# Patient Record
Sex: FEMALE | Race: WHITE | URBAN - METROPOLITAN AREA
[De-identification: names, ages, dates, MRNs, and addresses within clinical notes are randomized per-mention and may not be internally consistent; named-entity substitution may affect disease eponyms.]

---

## 2020-10-13 ENCOUNTER — HOSPITAL ENCOUNTER (OUTPATIENT)
Dept: MAMMOGRAPHY | Age: 55
Discharge: HOME | End: 2020-10-15
Payer: COMMERCIAL

## 2020-10-13 PROCEDURE — 77063 BREAST TOMOSYNTHESIS BI: CPT

## 2021-03-19 ENCOUNTER — OFFICE VISIT (OUTPATIENT)
Dept: ONCOLOGY | Age: 56
End: 2021-03-19
Payer: COMMERCIAL

## 2021-03-19 ENCOUNTER — HOSPITAL ENCOUNTER (OUTPATIENT)
Dept: INFUSION THERAPY | Age: 56
Discharge: HOME | End: 2021-03-19
Payer: COMMERCIAL

## 2021-03-19 VITALS
WEIGHT: 170.7 LBS | SYSTOLIC BLOOD PRESSURE: 135 MMHG | OXYGEN SATURATION: 98 % | DIASTOLIC BLOOD PRESSURE: 74 MMHG | HEART RATE: 62 BPM | BODY MASS INDEX: 32.23 KG/M2 | HEIGHT: 61 IN | TEMPERATURE: 97.2 F | RESPIRATION RATE: 18 BRPM

## 2021-03-19 DIAGNOSIS — D72.829 LEUKOCYTOSIS, UNSPECIFIED TYPE: ICD-10-CM

## 2021-03-19 DIAGNOSIS — D72.829 LEUKOCYTOSIS, UNSPECIFIED TYPE: Primary | ICD-10-CM

## 2021-03-19 LAB
ALBUMIN SERPL-MCNC: 4.2 G/DL (ref 3.5–5.2)
ALP BLD-CCNC: 86 U/L (ref 35–104)
ALT SERPL-CCNC: 48 U/L (ref 0–32)
ANION GAP SERPL CALCULATED.3IONS-SCNC: 9 MMOL/L (ref 7–16)
AST SERPL-CCNC: 45 U/L (ref 0–31)
BASOPHILS ABSOLUTE: 0.03 E9/L (ref 0–0.2)
BASOPHILS RELATIVE PERCENT: 0.5 % (ref 0–2)
BILIRUB SERPL-MCNC: 0.3 MG/DL (ref 0–1.2)
BUN BLDV-MCNC: 15 MG/DL (ref 6–20)
CALCIUM SERPL-MCNC: 9.2 MG/DL (ref 8.6–10.2)
CHLORIDE BLD-SCNC: 106 MMOL/L (ref 98–107)
CO2: 26 MMOL/L (ref 22–29)
CREAT SERPL-MCNC: 0.6 MG/DL (ref 0.5–1)
EOSINOPHILS ABSOLUTE: 0.09 E9/L (ref 0.05–0.5)
EOSINOPHILS RELATIVE PERCENT: 1.6 % (ref 0–6)
GFR AFRICAN AMERICAN: >60
GFR NON-AFRICAN AMERICAN: >60 ML/MIN/1.73
GLUCOSE BLD-MCNC: 94 MG/DL (ref 74–99)
HCT VFR BLD CALC: 43.7 % (ref 34–48)
HEMOGLOBIN: 13.3 G/DL (ref 11.5–15.5)
IMMATURE GRANULOCYTES #: 0.1 E9/L
IMMATURE GRANULOCYTES %: 1.8 % (ref 0–5)
LACTATE DEHYDROGENASE: 134 U/L (ref 135–214)
LYMPHOCYTES ABSOLUTE: 1.74 E9/L (ref 1.5–4)
LYMPHOCYTES RELATIVE PERCENT: 31.7 % (ref 20–42)
MCH RBC QN AUTO: 27.9 PG (ref 26–35)
MCHC RBC AUTO-ENTMCNC: 30.4 % (ref 32–34.5)
MCV RBC AUTO: 91.8 FL (ref 80–99.9)
MONOCYTES ABSOLUTE: 0.41 E9/L (ref 0.1–0.95)
MONOCYTES RELATIVE PERCENT: 7.5 % (ref 2–12)
NEUTROPHILS ABSOLUTE: 3.12 E9/L (ref 1.8–7.3)
NEUTROPHILS RELATIVE PERCENT: 56.9 % (ref 43–80)
PATHOLOGIST REVIEW: NORMAL
PDW BLD-RTO: 14.5 FL (ref 11.5–15)
PLATELET # BLD: 307 E9/L (ref 130–450)
PMV BLD AUTO: 10.2 FL (ref 7–12)
POTASSIUM SERPL-SCNC: 4 MMOL/L (ref 3.5–5)
RBC # BLD: 4.76 E12/L (ref 3.5–5.5)
SODIUM BLD-SCNC: 141 MMOL/L (ref 132–146)
TOTAL PROTEIN: 6.1 G/DL (ref 6.4–8.3)
WBC # BLD: 5.5 E9/L (ref 4.5–11.5)

## 2021-03-19 PROCEDURE — 83615 LACTATE (LD) (LDH) ENZYME: CPT

## 2021-03-19 PROCEDURE — 80053 COMPREHEN METABOLIC PANEL: CPT

## 2021-03-19 PROCEDURE — 36415 COLL VENOUS BLD VENIPUNCTURE: CPT

## 2021-03-19 PROCEDURE — 85025 COMPLETE CBC W/AUTO DIFF WBC: CPT

## 2021-03-19 PROCEDURE — 99214 OFFICE O/P EST MOD 30 MIN: CPT

## 2021-03-19 RX ORDER — PANTOPRAZOLE SODIUM 40 MG/1
TABLET, DELAYED RELEASE ORAL
COMMUNITY
Start: 2021-02-26

## 2021-03-19 RX ORDER — ESTRADIOL 1 MG/1
TABLET ORAL
COMMUNITY
Start: 2021-03-18

## 2021-03-19 RX ORDER — LEVOTHYROXINE SODIUM 0.03 MG/1
TABLET ORAL
COMMUNITY

## 2021-03-19 RX ORDER — CHOLECALCIFEROL (VITAMIN D3) 1250 MCG
CAPSULE ORAL
COMMUNITY
Start: 2021-03-05

## 2021-03-19 NOTE — PROGRESS NOTES
Elly Ndiaye  1965 50 y.o. Referring Physician:    PCP: Lucila Sanchez, DO    Vitals:    21 0916   BP: 135/74   Pulse: 62   Resp: 18   Temp: 97.2 °F (36.2 °C)   SpO2: 98%        Wt Readings from Last 3 Encounters:   21 170 lb 11.2 oz (77.4 kg)   10/17/17 156 lb (70.8 kg)   16 156 lb (70.8 kg)        Body mass index is 32.25 kg/m². Chief Complaint:   Chief Complaint   Patient presents with    New Patient         Cancer Staging  No matching staging information was found for the patient. Prior Radiation Therapy? NO    Concurrent Chemo/radiation? NO    Prior Chemotherapy? NO    Prior Hormonal Therapy? NO    Head and Neck Cancer? No, patient does NOT have HN cancer. LMP:     Age at first Menses: 7    : 3    Para: 2          Current Outpatient Medications:     Cholecalciferol (VITAMIN D3) 1.25 MG (45280 UT) CAPS, TAKE 1 CAPSULE BY MOUTH ONCE A WEEK FOR 8 WEEKS, Disp: , Rfl:     levothyroxine (SYNTHROID) 25 MCG tablet, , Disp: , Rfl:     pantoprazole (PROTONIX) 40 MG tablet, TAKE 1 TABLET BY MOUTH ONCE DAILY, Disp: , Rfl:     estradiol (ESTRACE) 1 MG tablet, , Disp: , Rfl:     therapeutic multivitamin-minerals (THERAGRAN-M) tablet, Take 1 tablet by mouth daily.  doesn't take daily, Disp: , Rfl:     clindamycin (CLEOCIN) 300 MG capsule, Take 300 mg by mouth 3 times daily, Disp: , Rfl:        Past Medical History:   Diagnosis Date    Cancer ([facility] [address] 1985    precancer on cervix    Nausea & vomiting     Ovarian cyst     PCOS (polycystic ovarian syndrome)     prior to gastric bypass    Uterine fibroid        Past Surgical History:   Procedure Laterality Date    ABDOMEN SURGERY      laparoscopic     CHOLECYSTECTOMY      FOOT SURGERY  2006    right   bone shaving    GASTRIC BYPASS SURGERY  2000    HYSTERECTOMY      OTHER SURGICAL HISTORY  2012    diagnositic lap right oophorectomy    HOMERO AND BSO N/A 13    Laparoscopic, robotic       History reviewed. No pertinent family history. Social History     Socioeconomic History    Marital status:      Spouse name: Not on file    Number of children: Not on file    Years of education: Not on file    Highest education level: Not on file   Occupational History    Not on file   Social Needs    Financial resource strain: Not on file    Food insecurity     Worry: Not on file     Inability: Not on file    Transportation needs     Medical: Not on file     Non-medical: Not on file   Tobacco Use    Smoking status: Never Smoker    Smokeless tobacco: Never Used   Substance and Sexual Activity    Alcohol use: No    Drug use: No    Sexual activity: Not on file   Lifestyle    Physical activity     Days per week: Not on file     Minutes per session: Not on file    Stress: Not on file   Relationships    Social connections     Talks on phone: Not on file     Gets together: Not on file     Attends Mandaen service: Not on file     Active member of club or organization: Not on file     Attends meetings of clubs or organizations: Not on file     Relationship status: Not on file    Intimate partner violence     Fear of current or ex partner: Not on file     Emotionally abused: Not on file     Physically abused: Not on file     Forced sexual activity: Not on file   Other Topics Concern    Not on file   Social History Narrative    Not on file           Occupation: /  Retired:  NO          REVIEW OF SYSTEMS:    Pacemaker/Defibulator/ICD:  No    Mediport: No           FALLS RISK SCREENING ASSESSMENT    Instructions:  Assess the patient and [place] the appropriate indicators that are present for fall risk identification. Total the numbers circled and assign a fall risk score from Table 2.  Reassess patient at a minimum every 12 weeks or with status change. Assessment   Date  3/19/2021     1. Mental Ability: confusion/cognitively impaired No - 0       2.   Elimination Issues:

## 2021-03-19 NOTE — PROGRESS NOTES
[address]  [facility] [address]   Hematology/Oncology  Consult      Patient Name: Billy Del Toro  YOB: 1965  PCP: Elvie Corea DO   Referring Provider:      Reason for Consultation: No chief complaint on file. History of Present Illness: This pt is a very pleasant 51 yo female who presents today in referral from her PCP for evaluation of her WBC. On review of her CBC from 2/26/21, she actually has a normal WBC, H+H and platelets. Differential reported a high myelocyte % at 6%, and absolute count of 456. She had a fall in ~mid 2/21, per patient on a wet floor and had sift tissue trauma to her L knee. She received a steroid injection and was placed on a medrol dose william which finished ~3 days prior to her CBC. She has not had recurrent fevers, chills night sweats, no unintentional weight loss. Appetite is stable and normal. No new masses or LN's. No abnormal bleeding or ecchymosis. She states the males in her family have a strong history of visceral cancer, and she may have had a grandfather with some type of leukemia (she can not recall).  Other than her knee, she feels well today    Diagnostic Data:     Past Medical History:   Diagnosis Date    Cancer ([address] 1985    precancer on cervix    Nausea & vomiting     Ovarian cyst     PCOS (polycystic ovarian syndrome)     prior to gastric bypass    Uterine fibroid        Patient Active Problem List    Diagnosis Date Noted    Excessive or frequent menstruation 06/14/2013    Leiomyoma of uterus 06/14/2013    Other and unspecified ovarian cyst 06/14/2013    Female genital symptoms 12/05/2012    Other and unspecified ovarian cyst 12/05/2012        Past Surgical History:   Procedure Laterality Date    ABDOMEN SURGERY      laparoscopic     CHOLECYSTECTOMY  1994    FOOT SURGERY  2006    right   bone shaving    GASTRIC BYPASS SURGERY  2000    HYSTERECTOMY      OTHER SURGICAL HISTORY  12/5/2012    diagnositic lap right oophorectomy    HOMERO AND BSO N/A 6-14-13    Laparoscopic, robotic       Family History  No family history on file. Social History    TOBACCO:   reports that she has never smoked. She has never used smokeless tobacco.  ETOH:   reports no history of alcohol use. Home Medications  Prior to Admission medications    Medication Sig Start Date End Date Taking? Authorizing Provider   estradiol (ESTRACE) 0.5 MG tablet Take 1 mg by mouth daily    Yes Historical Provider, MD   clindamycin (CLEOCIN) 300 MG capsule Take 300 mg by mouth 3 times daily    Historical Provider, MD   therapeutic multivitamin-minerals (THERAGRAN-M) tablet Take 1 tablet by mouth daily. doesn't take daily    Historical Provider, MD       Allergies  Allergies   Allergen Reactions    Morphine Hives and Itching     severe    Demerol Hcl [Meperidine] Nausea And Vomiting     Can tolerate if mixed with vistaril        Review of Systems:    + L knee pain and difficulty with ambulation      Objective  /74   Pulse 62   Temp 97.2 °F (36.2 °C)   Resp 18   Ht 5' 1\" (1.549 m)   Wt 170 lb 11.2 oz (77.4 kg)   LMP 04/01/2013   SpO2 98%   BMI 32.25 kg/m²     Physical Performance Status    Physical Exam:  General: AAO to person, place, time, and purpose, appears stated age, cooperative, no acute distress, pleasant   Head and neck : PERRLA, EOMI . Sclera non icteric. Oropharynx : Clear  Neck: no JVD,  no adenopathy  LYMPHATICS : No LAD  Lungs: Clear to auscultation   Heart: Regular rate and regular rhythm, no murmur, normal S1, S2  Abdomen: Soft, non-tender;no masses, no organomegaly  Extremities: No edema, L knee is supportive wrap/brace  Skin:  No rash  Neurologic:Cranial nerves grossly intact.  No focal motor or sensory deficits    Recent Laboratory Data-   Lab Results   Component Value Date    WBC 5.7 06/12/2013    HGB 11.9 06/12/2013    HCT 37.0 06/12/2013    MCV 77.3 (L) 06/12/2013     06/12/2013    LYMPHOPCT 5 (L) 05/02/2011 RBC 4.78 06/12/2013    MCH 25.0 (L) 06/12/2013    MCHC 32.3 06/12/2013    RDW 15.9 (H) 06/12/2013    MONOPCT 5 05/02/2011    BASOPCT 0 05/02/2011    NEUTROABS 9.06 (H) 05/02/2011    LYMPHSABS 0.55 (L) 05/02/2011    MONOSABS 0.55 05/02/2011    EOSABS 0.00 (L) 05/02/2011    BASOSABS 0.01 05/02/2011       Lab Results   Component Value Date     04/28/2011    K 3.7 04/28/2011     04/28/2011    CO2 25 04/28/2011    BUN 17 04/28/2011    CREATININE 0.8 04/28/2011    GLUCOSE 86 04/28/2011    CALCIUM 10.0 04/28/2011    LABGLOM >60 04/28/2011       No results found for: IRON, TIBC, FERRITIN        Radiology-    No results found. ASSESSMENT/PLAN :    Diagnoses and all orders for this visit:    Leukocytosis, unspecified type    49 yo female  Elevated myelocyte count    - CBC reviewed as in HPI. Completely normal except increased myelocytes  - She has no B symptoms/alarm symptoms  - Physical exam is negative  - Repeat CBC. Check LDH and get manual smear  - Possible the myelocyte count is related to prior steroid exposure or lab abnormality  - RTC in 3 weeks for results review and CBC recheck. Further evaluation pending above results    Thank you for this consult.  Please call with further questions or concerns      Maria Eugenia Gillette MD   Electronically signed 3/19/2021 at 9:32 AM

## 2021-04-08 ENCOUNTER — HOSPITAL ENCOUNTER (OUTPATIENT)
Dept: INFUSION THERAPY | Age: 56
Discharge: HOME | End: 2021-04-08
Payer: COMMERCIAL

## 2021-04-08 ENCOUNTER — APPOINTMENT (OUTPATIENT)
Dept: INFUSION THERAPY | Age: 56
End: 2021-04-08
Payer: COMMERCIAL

## 2021-04-08 DIAGNOSIS — D72.829 LEUKOCYTOSIS, UNSPECIFIED TYPE: ICD-10-CM

## 2021-04-08 LAB
ALBUMIN SERPL-MCNC: 3.9 G/DL (ref 3.5–5.2)
ALP BLD-CCNC: 77 U/L (ref 35–104)
ALT SERPL-CCNC: 43 U/L (ref 0–32)
ANION GAP SERPL CALCULATED.3IONS-SCNC: 9 MMOL/L (ref 7–16)
AST SERPL-CCNC: 39 U/L (ref 0–31)
BASOPHILS ABSOLUTE: 0.04 E9/L (ref 0–0.2)
BASOPHILS RELATIVE PERCENT: 0.5 % (ref 0–2)
BILIRUB SERPL-MCNC: 0.5 MG/DL (ref 0–1.2)
BUN BLDV-MCNC: 19 MG/DL (ref 6–20)
CALCIUM SERPL-MCNC: 9.1 MG/DL (ref 8.6–10.2)
CHLORIDE BLD-SCNC: 101 MMOL/L (ref 98–107)
CO2: 26 MMOL/L (ref 22–29)
CREAT SERPL-MCNC: 0.6 MG/DL (ref 0.5–1)
EOSINOPHILS ABSOLUTE: 0.07 E9/L (ref 0.05–0.5)
EOSINOPHILS RELATIVE PERCENT: 0.8 % (ref 0–6)
GFR AFRICAN AMERICAN: >60
GFR NON-AFRICAN AMERICAN: >60 ML/MIN/1.73
GLUCOSE BLD-MCNC: 107 MG/DL (ref 74–99)
HCT VFR BLD CALC: 41.5 % (ref 34–48)
HEMOGLOBIN: 12.8 G/DL (ref 11.5–15.5)
IMMATURE GRANULOCYTES #: 0.11 E9/L
IMMATURE GRANULOCYTES %: 1.3 % (ref 0–5)
LYMPHOCYTES ABSOLUTE: 2.11 E9/L (ref 1.5–4)
LYMPHOCYTES RELATIVE PERCENT: 25 % (ref 20–42)
MCH RBC QN AUTO: 28.4 PG (ref 26–35)
MCHC RBC AUTO-ENTMCNC: 30.8 % (ref 32–34.5)
MCV RBC AUTO: 92.2 FL (ref 80–99.9)
MONOCYTES ABSOLUTE: 0.58 E9/L (ref 0.1–0.95)
MONOCYTES RELATIVE PERCENT: 6.9 % (ref 2–12)
NEUTROPHILS ABSOLUTE: 5.53 E9/L (ref 1.8–7.3)
NEUTROPHILS RELATIVE PERCENT: 65.5 % (ref 43–80)
PDW BLD-RTO: 14.6 FL (ref 11.5–15)
PLATELET # BLD: 321 E9/L (ref 130–450)
PMV BLD AUTO: 10.3 FL (ref 7–12)
POTASSIUM SERPL-SCNC: 4.5 MMOL/L (ref 3.5–5)
RBC # BLD: 4.5 E12/L (ref 3.5–5.5)
SODIUM BLD-SCNC: 136 MMOL/L (ref 132–146)
TOTAL PROTEIN: 5.9 G/DL (ref 6.4–8.3)
WBC # BLD: 8.4 E9/L (ref 4.5–11.5)

## 2021-04-08 PROCEDURE — 36415 COLL VENOUS BLD VENIPUNCTURE: CPT

## 2021-04-08 PROCEDURE — 80053 COMPREHEN METABOLIC PANEL: CPT

## 2021-04-08 PROCEDURE — 85025 COMPLETE CBC W/AUTO DIFF WBC: CPT

## 2021-04-09 ENCOUNTER — OFFICE VISIT (OUTPATIENT)
Dept: ONCOLOGY | Age: 56
End: 2021-04-09
Payer: COMMERCIAL

## 2021-04-09 VITALS
DIASTOLIC BLOOD PRESSURE: 75 MMHG | RESPIRATION RATE: 18 BRPM | WEIGHT: 169.5 LBS | SYSTOLIC BLOOD PRESSURE: 107 MMHG | OXYGEN SATURATION: 97 % | HEART RATE: 62 BPM | HEIGHT: 61 IN | TEMPERATURE: 96.9 F | BODY MASS INDEX: 32 KG/M2

## 2021-04-09 DIAGNOSIS — D72.829 LEUKOCYTOSIS, UNSPECIFIED TYPE: Primary | ICD-10-CM

## 2021-04-09 PROCEDURE — 99212 OFFICE O/P EST SF 10 MIN: CPT

## 2021-04-09 NOTE — PROGRESS NOTES
[address]  [address]   Hematology/Oncology  Consult      Patient Name: Ramon Lobo  YOB: 1965  PCP: Juan Narayan DO   Referring Provider:      Reason for Consultation: No chief complaint on file. Subjective:  Feels well, no acute complaints today or issues in interim. Had acupuncture in interim    History of Present Illness: This pt is a very pleasant 49 yo female who presents today in referral from her PCP for evaluation of her WBC. On review of her CBC from 2/26/21, she actually has a normal WBC, H+H and platelets. Differential reported a high myelocyte % at 6%, and absolute count of 456. She had a fall in ~mid 2/21, per patient on a wet floor and had sift tissue trauma to her L knee. She received a steroid injection and was placed on a medrol dose william which finished ~3 days prior to her CBC. She has not had recurrent fevers, chills night sweats, no unintentional weight loss. Appetite is stable and normal. No new masses or LN's. No abnormal bleeding or ecchymosis. She states the males in her family have a strong history of visceral cancer, and she may have had a grandfather with some type of leukemia (she can not recall).  Other than her knee, she feels well today    Diagnostic Data:     Past Medical History:   Diagnosis Date    Cancer ([address] 1985    precancer on cervix    Nausea & vomiting     Ovarian cyst     PCOS (polycystic ovarian syndrome)     prior to gastric bypass    Thyroid disease     Uterine fibroid        Patient Active Problem List    Diagnosis Date Noted    Excessive or frequent menstruation 06/14/2013    Leiomyoma of uterus 06/14/2013    Other and unspecified ovarian cyst 06/14/2013    Female genital symptoms 12/05/2012    Other and unspecified ovarian cyst 12/05/2012        Past Surgical History:   Procedure Laterality Date    ABDOMEN SURGERY      laparoscopic     CHOLECYSTECTOMY  1994    FOOT SURGERY  2006 right   bone shaving    GASTRIC BYPASS SURGERY  2000    HYSTERECTOMY      OTHER SURGICAL HISTORY  12/5/2012    diagnositic lap right oophorectomy    HOMERO AND BSO N/A 6-14-13    Laparoscopic, robotic       Family History  Family History   Problem Relation Age of Onset    Other Mother         hip replacement    Heart Disease Father     Cancer Father         lung       Social History    TOBACCO:   reports that she has never smoked. She has never used smokeless tobacco.  ETOH:   reports no history of alcohol use. Home Medications  Prior to Admission medications    Medication Sig Start Date End Date Taking? Authorizing Provider   Cholecalciferol (VITAMIN D3) 1.25 MG (49918 UT) CAPS TAKE 1 CAPSULE BY MOUTH ONCE A WEEK FOR 8 WEEKS 3/5/21   Historical Provider, MD   levothyroxine (SYNTHROID) 25 MCG tablet     Historical Provider, MD   pantoprazole (PROTONIX) 40 MG tablet TAKE 1 TABLET BY MOUTH ONCE DAILY 2/26/21   Historical Provider, MD   estradiol (ESTRACE) 1 MG tablet  3/18/21   Historical Provider, MD   clindamycin (CLEOCIN) 300 MG capsule Take 300 mg by mouth 3 times daily    Historical Provider, MD   therapeutic multivitamin-minerals (THERAGRAN-M) tablet Take 1 tablet by mouth daily. doesn't take daily    Historical Provider, MD       Allergies  Allergies   Allergen Reactions    Morphine Hives and Itching     severe    Demerol Hcl [Meperidine] Nausea And Vomiting     Can tolerate if mixed with vistaril        Review of Systems:    + L knee pain and difficulty with ambulation      Objective  LMP 04/01/2013     Physical Performance Status    Physical Exam:  General: AAO to person, place, time, and purpose, appears stated age, cooperative, no acute distress, pleasant   Head and neck : PERRLA, EOMI . Sclera non icteric.   Oropharynx : Clear  Neck: no JVD,  no adenopathy  LYMPHATICS : No LAD  Lungs: Clear to auscultation   Heart: Regular rate and regular rhythm, no murmur, normal S1, S2  Abdomen: Soft, non-tender;no masses, no organomegaly  Extremities: No edema, L knee is supportive wrap/brace  Skin:  No rash  Neurologic:Cranial nerves grossly intact. No focal motor or sensory deficits    Recent Laboratory Data-   Lab Results   Component Value Date    WBC 8.4 04/08/2021    HGB 12.8 04/08/2021    HCT 41.5 04/08/2021    MCV 92.2 04/08/2021     04/08/2021    LYMPHOPCT 25.0 04/08/2021    RBC 4.50 04/08/2021    MCH 28.4 04/08/2021    MCHC 30.8 (L) 04/08/2021    RDW 14.6 04/08/2021    NEUTOPHILPCT 65.5 04/08/2021    MONOPCT 6.9 04/08/2021    BASOPCT 0.5 04/08/2021    NEUTROABS 5.53 04/08/2021    LYMPHSABS 2.11 04/08/2021    MONOSABS 0.58 04/08/2021    EOSABS 0.07 04/08/2021    BASOSABS 0.04 04/08/2021       Lab Results   Component Value Date     04/08/2021    K 4.5 04/08/2021     04/08/2021    CO2 26 04/08/2021    BUN 19 04/08/2021    CREATININE 0.6 04/08/2021    GLUCOSE 107 (H) 04/08/2021    CALCIUM 9.1 04/08/2021    PROT 5.9 (L) 04/08/2021    LABALBU 3.9 04/08/2021    BILITOT 0.5 04/08/2021    ALKPHOS 77 04/08/2021    AST 39 (H) 04/08/2021    ALT 43 (H) 04/08/2021    LABGLOM >60 04/08/2021    GFRAA >60 04/08/2021       No results found for: IRON, TIBC, FERRITIN        Radiology-    No results found. ASSESSMENT/PLAN :    Diagnoses and all orders for this visit:    Leukocytosis, unspecified type    51 yo female  Elevated myelocyte count    - CBC reviewed as in HPI. Completely normal except increased myelocytes  - She has no B symptoms/alarm symptoms  - Physical exam is negative  - Repeat CBC. Check LDH and get manual smear  - Possible the myelocyte count is related to prior steroid exposure or lab abnormality  - RTC in 3 weeks for results review and CBC recheck. Further evaluation pending above results    4/9/21  - CBC at consultation and CBC from yesterday WNL.  Normal WBC, normal differential with no myelocytes  - Clinically feels well  - LDH was actually a little low at 134  - Smear reviewed, no concerning findings (occasional reactive lymphocyte)  - As there are no abnormalities present currently and she feels well, will have her follow on a PRN basis      Marah Luna MD   Electronically signed 4/9/2021 at 9:21 AM

## 2023-09-25 ENCOUNTER — HOSPITAL ENCOUNTER (OUTPATIENT)
Age: 58
Discharge: HOME | End: 2023-09-25
Payer: COMMERCIAL

## 2023-09-25 LAB
EKG ATRIAL RATE: 56 BPM
EKG P AXIS: 61 DEGREES
EKG P-R INTERVAL: 144 MS
EKG Q-T INTERVAL: 412 MS
EKG QRS DURATION: 98 MS
EKG QTC CALCULATION (BAZETT): 397 MS
EKG R AXIS: 44 DEGREES
EKG T AXIS: 44 DEGREES
EKG VENTRICULAR RATE: 56 BPM

## 2023-09-25 PROCEDURE — 93005 ELECTROCARDIOGRAM TRACING: CPT | Performed by: OBSTETRICS & GYNECOLOGY

## 2023-09-26 ENCOUNTER — HOSPITAL ENCOUNTER (OUTPATIENT)
Dept: MAMMOGRAPHY | Age: 58
Discharge: HOME | End: 2023-09-28
Attending: OBSTETRICS & GYNECOLOGY
Payer: COMMERCIAL

## 2023-09-26 VITALS — WEIGHT: 180 LBS | HEIGHT: 62 IN | BODY MASS INDEX: 33.13 KG/M2

## 2023-09-26 DIAGNOSIS — Z12.31 ENCOUNTER FOR SCREENING MAMMOGRAM FOR MALIGNANT NEOPLASM OF BREAST: ICD-10-CM

## 2023-09-26 PROCEDURE — 77063 BREAST TOMOSYNTHESIS BI: CPT
